# Patient Record
Sex: FEMALE | Race: WHITE | URBAN - METROPOLITAN AREA
[De-identification: names, ages, dates, MRNs, and addresses within clinical notes are randomized per-mention and may not be internally consistent; named-entity substitution may affect disease eponyms.]

---

## 2023-04-21 ENCOUNTER — OFFICE (OUTPATIENT)
Dept: URBAN - METROPOLITAN AREA CLINIC 113 | Facility: CLINIC | Age: 78
Setting detail: OPHTHALMOLOGY
End: 2023-04-21
Payer: MEDICARE

## 2023-04-21 DIAGNOSIS — H35.373: ICD-10-CM

## 2023-04-21 DIAGNOSIS — Z96.1: ICD-10-CM

## 2023-04-21 DIAGNOSIS — H53.001: ICD-10-CM

## 2023-04-21 DIAGNOSIS — H43.811: ICD-10-CM

## 2023-04-21 DIAGNOSIS — H16.223: ICD-10-CM

## 2023-04-21 PROCEDURE — 92014 COMPRE OPH EXAM EST PT 1/>: CPT | Performed by: OPHTHALMOLOGY

## 2023-04-21 PROCEDURE — 92250 FUNDUS PHOTOGRAPHY W/I&R: CPT | Performed by: OPHTHALMOLOGY

## 2023-04-21 ASSESSMENT — REFRACTION_MANIFEST
OD_SPHERE: -0.50
OD_CYLINDER: -0.25
OD_AXIS: 080
OS_VA1: 20/20
OS_ADD: +2.50
OD_AXIS: 133
OD_CYLINDER: -0.50
OS_AXIS: 112
OD_VA1: 20/30-
OS_SPHERE: +1.00
OS_CYLINDER: -0.50
OD_ADD: +1.50
OS_VA1: 20/25
OD_SPHERE: +3.00
OS_ADD: +1.50
OS_SPHERE: PL
OD_VA1: 20/20
OD_ADD: +2.50

## 2023-04-21 ASSESSMENT — REFRACTION_CURRENTRX
OD_OVR_VA: 20/
OS_OVR_VA: 20/
OS_SPHERE: +6.50
OD_AXIS: 087
OD_OVR_VA: 20/
OS_VPRISM_DIRECTION: PROGS
OS_OVR_VA: 20/
OD_CYLINDER: -0.50
OS_SPHERE: +6.50
OD_ADD: +2.50
OD_CYLINDER: -0.50
OD_SPHERE: +3.50
OD_ADD: +2.00
OS_AXIS: 88
OD_AXIS: 94
OD_VPRISM_DIRECTION: PROGS
OS_VPRISM_DIRECTION: PROGS
OS_AXIS: 095
OS_ADD: +2.00
OD_SPHERE: +3.50
OS_CYLINDER: -0.25
OS_ADD: +2.50
OS_CYLINDER: -0.25
OD_VPRISM_DIRECTION: PROGS

## 2023-04-21 ASSESSMENT — REFRACTION_AUTOREFRACTION
OD_AXIS: 087
OD_CYLINDER: -0.75
OD_SPHERE: +0.25
OS_SPHERE: +1.50
OS_CYLINDER: -1.00
OS_AXIS: 089

## 2023-04-21 ASSESSMENT — SPHEQUIV_DERIVED
OS_SPHEQUIV: 0.75
OD_SPHEQUIV: -0.625
OD_SPHEQUIV: 2.75
OD_SPHEQUIV: -0.125
OS_SPHEQUIV: 1

## 2023-04-21 ASSESSMENT — VISUAL ACUITY
OD_BCVA: 20/25
OS_BCVA: 20/25-1

## 2023-04-21 ASSESSMENT — AXIALLENGTH_DERIVED
OD_AL: 23.8379
OD_AL: 22.567
OD_AL: 23.6407
OS_AL: 23.3932
OS_AL: 23.2978

## 2023-04-21 ASSESSMENT — TONOMETRY
OS_IOP_MMHG: 13
OD_IOP_MMHG: 11

## 2023-04-21 ASSESSMENT — KERATOMETRY
OD_AXISANGLE_DEGREES: 099
OD_K2POWER_DIOPTERS: 43.75
OS_K2POWER_DIOPTERS: 43.50
OS_K1POWER_DIOPTERS: 43.00
OS_AXISANGLE_DEGREES: 119
OD_K1POWER_DIOPTERS: 43.25

## 2023-04-21 ASSESSMENT — CONFRONTATIONAL VISUAL FIELD TEST (CVF)
OS_FINDINGS: FULL
OD_FINDINGS: FULL

## 2023-04-21 ASSESSMENT — SUPERFICIAL PUNCTATE KERATITIS (SPK)
OD_SPK: T
OS_SPK: T

## 2023-06-08 PROBLEM — Z00.00 ENCOUNTER FOR PREVENTIVE HEALTH EXAMINATION: Status: ACTIVE | Noted: 2023-06-08

## 2023-06-12 ENCOUNTER — NON-APPOINTMENT (OUTPATIENT)
Age: 78
End: 2023-06-12

## 2023-06-12 ENCOUNTER — APPOINTMENT (OUTPATIENT)
Dept: CARDIOLOGY | Facility: CLINIC | Age: 78
End: 2023-06-12
Payer: MEDICARE

## 2023-06-12 VITALS
TEMPERATURE: 97.7 F | HEIGHT: 58 IN | OXYGEN SATURATION: 99 % | DIASTOLIC BLOOD PRESSURE: 74 MMHG | BODY MASS INDEX: 21.26 KG/M2 | HEART RATE: 79 BPM | SYSTOLIC BLOOD PRESSURE: 118 MMHG | WEIGHT: 101.25 LBS

## 2023-06-12 DIAGNOSIS — Z78.9 OTHER SPECIFIED HEALTH STATUS: ICD-10-CM

## 2023-06-12 DIAGNOSIS — Z87.891 PERSONAL HISTORY OF NICOTINE DEPENDENCE: ICD-10-CM

## 2023-06-12 DIAGNOSIS — Z82.49 FAMILY HISTORY OF ISCHEMIC HEART DISEASE AND OTHER DISEASES OF THE CIRCULATORY SYSTEM: ICD-10-CM

## 2023-06-12 DIAGNOSIS — R00.2 PALPITATIONS: ICD-10-CM

## 2023-06-12 DIAGNOSIS — M81.0 AGE-RELATED OSTEOPOROSIS W/OUT CURRENT PATHOLOGICAL FRACTURE: ICD-10-CM

## 2023-06-12 DIAGNOSIS — R42 DIZZINESS AND GIDDINESS: ICD-10-CM

## 2023-06-12 PROCEDURE — 93000 ELECTROCARDIOGRAM COMPLETE: CPT

## 2023-06-12 PROCEDURE — 99204 OFFICE O/P NEW MOD 45 MIN: CPT

## 2023-06-12 RX ORDER — MULTIVITAMIN
TABLET ORAL DAILY
Refills: 0 | Status: ACTIVE | COMMUNITY
Start: 2023-06-12

## 2023-06-12 RX ORDER — DENOSUMAB 60 MG/ML
60 INJECTION SUBCUTANEOUS
Refills: 0 | Status: ACTIVE | COMMUNITY

## 2023-06-12 RX ORDER — ATORVASTATIN CALCIUM 20 MG/1
20 TABLET, FILM COATED ORAL
Refills: 0 | Status: ACTIVE | COMMUNITY

## 2023-06-12 RX ORDER — LYSINE HCL 500 MG
600 TABLET ORAL
Refills: 0 | Status: ACTIVE | COMMUNITY
Start: 2023-06-12

## 2023-06-12 RX ORDER — CYANOCOBALAMIN (VITAMIN B-12) 1000 MCG
1000 TABLET ORAL DAILY
Refills: 0 | Status: ACTIVE | COMMUNITY
Start: 2023-06-12

## 2023-06-12 RX ORDER — MULTIVIT WITH MIN/MFOLATE/K2 340-15/3 G
50 MCG POWDER (GRAM) ORAL
Refills: 0 | Status: ACTIVE | COMMUNITY
Start: 2023-06-12

## 2023-06-19 PROBLEM — R42 LIGHTHEADEDNESS: Status: ACTIVE | Noted: 2023-06-12

## 2023-06-19 PROBLEM — R00.2 PALPITATIONS: Status: ACTIVE | Noted: 2023-06-12

## 2023-06-19 NOTE — REASON FOR VISIT
[Symptom and Test Evaluation] : symptom and test evaluation [FreeTextEntry1] : 78 y/o F with PMH of Hyperlipidemia and OA present for evaluation based on TTE results and palpitations \par \par Patient notes that she has been having worsening palpitations over the past couple of months since April 2023, she notes that it can occur on a daily basis or multiple times in a day; which she states today she has noted worsening palpitations occurring multiple times prior to the visit. She notes that she was seeing her PMD and discussed she has been having dizziness and was sent for US carotid and TTE. \par Lightheadedness may coincide with the palpitations and notes no syncopal episodes.  \par No lack of appetite and somewhat hydrating herself well \par in 2012 nuclear a stress test and TTE \par \par Smoking Hx: fomer quit 1997, 1ppd \par Family Hx: mother 86 yrs heart attck \par \par Alvino [facility] [address] was [phone] 10.15.2012 (former cardiologist)

## 2023-06-19 NOTE — ASSESSMENT
[FreeTextEntry1] : 76 y/o F with PMH of Hyperlipidemia and OA present for evaluation based on TTE results and palpitations \par \par 1) Palpitations \par - patient notes that she has been has been having palpitations which seems to be worsening\par - Sinus  Rhythm @ 77 bpm, left axis deviation, Low voltage with rightward P-axis and rotation -possible pulmonary disease. \par -TTE done at 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 [facility]\par Cardiology/Vascular Cardiology -[facility]\par Telephone # [phone]\par

## 2023-06-19 NOTE — CARDIOLOGY SUMMARY
[de-identified] : Sinus  Rhythm @ 77 bpm, left axis deviation, Low voltage with rightward P-axis and rotation -possible pulmonary disease.  [de-identified] : Small plaque in each carotid bulb larger on the left side \par No hemodynamically significant stenosis

## 2023-08-22 ENCOUNTER — TRANSCRIPTION ENCOUNTER (OUTPATIENT)
Age: 78
End: 2023-08-22

## 2023-10-13 ENCOUNTER — OFFICE (OUTPATIENT)
Dept: URBAN - METROPOLITAN AREA CLINIC 113 | Facility: CLINIC | Age: 78
Setting detail: OPHTHALMOLOGY
End: 2023-10-13
Payer: MEDICARE

## 2023-10-13 DIAGNOSIS — H16.221: ICD-10-CM

## 2023-10-13 DIAGNOSIS — H35.373: ICD-10-CM

## 2023-10-13 DIAGNOSIS — H16.223: ICD-10-CM

## 2023-10-13 DIAGNOSIS — H16.222: ICD-10-CM

## 2023-10-13 DIAGNOSIS — H43.811: ICD-10-CM

## 2023-10-13 PROCEDURE — 83861 MICROFLUID ANALY TEARS: CPT | Mod: QW,LT | Performed by: OPHTHALMOLOGY

## 2023-10-13 PROCEDURE — 83861 MICROFLUID ANALY TEARS: CPT | Mod: QW,RT | Performed by: OPHTHALMOLOGY

## 2023-10-13 PROCEDURE — 99213 OFFICE O/P EST LOW 20 MIN: CPT | Performed by: OPHTHALMOLOGY

## 2023-10-13 ASSESSMENT — REFRACTION_CURRENTRX
OD_ADD: +2.00
OD_VPRISM_DIRECTION: PROGS
OS_AXIS: 095
OS_OVR_VA: 20/
OS_OVR_VA: 20/
OD_CYLINDER: -0.50
OD_CYLINDER: -0.50
OS_SPHERE: +6.50
OS_ADD: +2.00
OD_SPHERE: +3.50
OD_ADD: +2.50
OS_CYLINDER: -0.25
OS_SPHERE: +6.50
OD_OVR_VA: 20/
OD_SPHERE: +3.50
OD_OVR_VA: 20/
OD_VPRISM_DIRECTION: PROGS
OD_AXIS: 087
OS_ADD: +2.50
OS_AXIS: 88
OS_VPRISM_DIRECTION: PROGS
OS_VPRISM_DIRECTION: PROGS
OD_AXIS: 94
OS_CYLINDER: -0.25

## 2023-10-13 ASSESSMENT — CONFRONTATIONAL VISUAL FIELD TEST (CVF)
OS_FINDINGS: FULL
OD_FINDINGS: FULL

## 2023-10-13 ASSESSMENT — REFRACTION_MANIFEST
OD_AXIS: 133
OS_ADD: +1.50
OS_VA1: 20/20
OD_CYLINDER: -0.25
OS_AXIS: 112
OD_VA1: 20/20
OD_CYLINDER: -0.50
OS_SPHERE: +1.00
OD_ADD: +2.50
OS_VA1: 20/25
OS_ADD: +2.50
OD_SPHERE: -0.50
OD_ADD: +1.50
OS_CYLINDER: -0.50
OS_SPHERE: PL
OD_AXIS: 080
OD_VA1: 20/30-
OD_SPHERE: +3.00

## 2023-10-13 ASSESSMENT — KERATOMETRY
OS_K1POWER_DIOPTERS: 43.00
OD_K2POWER_DIOPTERS: 43.75
OD_K1POWER_DIOPTERS: 43.25
OS_AXISANGLE_DEGREES: 119
OD_AXISANGLE_DEGREES: 099
OS_K2POWER_DIOPTERS: 43.50

## 2023-10-13 ASSESSMENT — SPHEQUIV_DERIVED
OS_SPHEQUIV: 0.75
OD_SPHEQUIV: 0.25
OS_SPHEQUIV: 1.375
OD_SPHEQUIV: -0.625
OD_SPHEQUIV: 2.75

## 2023-10-13 ASSESSMENT — AXIALLENGTH_DERIVED
OD_AL: 23.4949
OS_AL: 23.3932
OD_AL: 23.8379
OS_AL: 23.1562
OD_AL: 22.567

## 2023-10-13 ASSESSMENT — SUPERFICIAL PUNCTATE KERATITIS (SPK)
OD_SPK: T
OS_SPK: 1+

## 2023-10-13 ASSESSMENT — REFRACTION_AUTOREFRACTION
OS_SPHERE: +2.00
OS_CYLINDER: -1.25
OD_AXIS: 080
OD_SPHERE: +0.75
OD_CYLINDER: -1.00
OS_AXIS: 089

## 2023-10-13 ASSESSMENT — VISUAL ACUITY
OD_BCVA: 20/20-1
OS_BCVA: 20/20-1

## 2023-10-13 ASSESSMENT — TONOMETRY
OD_IOP_MMHG: 10
OS_IOP_MMHG: 13

## 2023-10-31 ENCOUNTER — APPOINTMENT (OUTPATIENT)
Dept: CARDIOLOGY | Facility: CLINIC | Age: 78
End: 2023-10-31
Payer: MEDICARE

## 2023-10-31 VITALS
SYSTOLIC BLOOD PRESSURE: 138 MMHG | OXYGEN SATURATION: 100 % | HEART RATE: 69 BPM | DIASTOLIC BLOOD PRESSURE: 72 MMHG | BODY MASS INDEX: 21.41 KG/M2 | HEIGHT: 58 IN | WEIGHT: 102 LBS

## 2023-10-31 DIAGNOSIS — Q26.9 CONGENITAL MALFORMATION OF GREAT VEIN, UNSPECIFIED: ICD-10-CM

## 2023-10-31 DIAGNOSIS — R01.1 CARDIAC MURMUR, UNSPECIFIED: ICD-10-CM

## 2023-10-31 DIAGNOSIS — E78.5 HYPERLIPIDEMIA, UNSPECIFIED: ICD-10-CM

## 2023-10-31 PROCEDURE — 93000 ELECTROCARDIOGRAM COMPLETE: CPT

## 2023-10-31 PROCEDURE — 99213 OFFICE O/P EST LOW 20 MIN: CPT

## 2023-11-13 PROBLEM — R01.1 HEART MURMUR: Status: ACTIVE | Noted: 2023-06-12

## 2023-11-13 PROBLEM — Q26.9 EUSTACHIAN VALVE ENLARGED: Status: ACTIVE | Noted: 2023-11-13

## 2023-11-13 PROBLEM — E78.5 HLD (HYPERLIPIDEMIA): Status: ACTIVE | Noted: 2023-06-12

## 2024-04-19 ENCOUNTER — OFFICE (OUTPATIENT)
Dept: URBAN - METROPOLITAN AREA CLINIC 113 | Facility: CLINIC | Age: 79
Setting detail: OPHTHALMOLOGY
End: 2024-04-19
Payer: MEDICARE

## 2024-04-19 DIAGNOSIS — H43.811: ICD-10-CM

## 2024-04-19 DIAGNOSIS — H16.222: ICD-10-CM

## 2024-04-19 DIAGNOSIS — H35.373: ICD-10-CM

## 2024-04-19 DIAGNOSIS — H16.223: ICD-10-CM

## 2024-04-19 DIAGNOSIS — H16.221: ICD-10-CM

## 2024-04-19 DIAGNOSIS — H01.002: ICD-10-CM

## 2024-04-19 PROBLEM — H01.005 BLEPHARITIS; RIGHT LOWER LID, LEFT LOWER LID: Status: ACTIVE | Noted: 2024-04-19

## 2024-04-19 PROCEDURE — 92014 COMPRE OPH EXAM EST PT 1/>: CPT | Performed by: OPHTHALMOLOGY

## 2024-04-19 PROCEDURE — 83861 MICROFLUID ANALY TEARS: CPT | Mod: QW,RT | Performed by: OPHTHALMOLOGY

## 2024-04-19 PROCEDURE — 92250 FUNDUS PHOTOGRAPHY W/I&R: CPT | Performed by: OPHTHALMOLOGY

## 2024-04-19 PROCEDURE — 83861 MICROFLUID ANALY TEARS: CPT | Mod: QW,LT | Performed by: OPHTHALMOLOGY

## 2024-04-19 ASSESSMENT — LID EXAM ASSESSMENTS
OS_BLEPHARITIS: LLL T
OD_BLEPHARITIS: RLL T

## 2024-11-04 ENCOUNTER — APPOINTMENT (OUTPATIENT)
Dept: CARDIOLOGY | Facility: CLINIC | Age: 79
End: 2024-11-04
Payer: MEDICARE

## 2024-11-04 VITALS
HEIGHT: 58 IN | WEIGHT: 98 LBS | HEART RATE: 71 BPM | BODY MASS INDEX: 20.57 KG/M2 | OXYGEN SATURATION: 99 % | DIASTOLIC BLOOD PRESSURE: 71 MMHG | SYSTOLIC BLOOD PRESSURE: 143 MMHG

## 2024-11-04 DIAGNOSIS — E78.5 HYPERLIPIDEMIA, UNSPECIFIED: ICD-10-CM

## 2024-11-04 PROCEDURE — 99213 OFFICE O/P EST LOW 20 MIN: CPT

## 2024-11-04 PROCEDURE — 93000 ELECTROCARDIOGRAM COMPLETE: CPT

## 2024-11-05 ENCOUNTER — NON-APPOINTMENT (OUTPATIENT)
Age: 79
End: 2024-11-05

## 2024-11-22 ENCOUNTER — OFFICE (OUTPATIENT)
Dept: URBAN - METROPOLITAN AREA CLINIC 113 | Facility: CLINIC | Age: 79
Setting detail: OPHTHALMOLOGY
End: 2024-11-22
Payer: MEDICARE

## 2024-11-22 DIAGNOSIS — Z96.1: ICD-10-CM

## 2024-11-22 DIAGNOSIS — H16.223: ICD-10-CM

## 2024-11-22 DIAGNOSIS — H43.811: ICD-10-CM

## 2024-11-22 DIAGNOSIS — H16.222: ICD-10-CM

## 2024-11-22 DIAGNOSIS — H35.373: ICD-10-CM

## 2024-11-22 DIAGNOSIS — H01.005: ICD-10-CM

## 2024-11-22 DIAGNOSIS — H01.002: ICD-10-CM

## 2024-11-22 DIAGNOSIS — H16.221: ICD-10-CM

## 2024-11-22 PROCEDURE — 83861 MICROFLUID ANALY TEARS: CPT | Mod: QW,RT | Performed by: OPHTHALMOLOGY

## 2024-11-22 PROCEDURE — 83861 MICROFLUID ANALY TEARS: CPT | Mod: QW,LT | Performed by: OPHTHALMOLOGY

## 2024-11-22 PROCEDURE — 92014 COMPRE OPH EXAM EST PT 1/>: CPT | Performed by: OPHTHALMOLOGY

## 2024-11-22 ASSESSMENT — CONFRONTATIONAL VISUAL FIELD TEST (CVF)
OS_FINDINGS: FULL
OD_FINDINGS: FULL

## 2024-11-22 ASSESSMENT — REFRACTION_CURRENTRX
OD_SPHERE: +3.50
OD_CYLINDER: -0.50
OD_OVR_VA: 20/
OS_VPRISM_DIRECTION: PROGS
OS_VPRISM_DIRECTION: PROGS
OD_VPRISM_DIRECTION: PROGS
OD_AXIS: 087
OD_OVR_VA: 20/
OS_SPHERE: +6.50
OS_OVR_VA: 20/
OD_ADD: +2.00
OS_AXIS: 88
OS_AXIS: 095
OS_CYLINDER: -0.25
OS_ADD: +2.50
OS_ADD: +2.00
OD_VPRISM_DIRECTION: PROGS
OD_ADD: +2.50
OD_AXIS: 94
OS_OVR_VA: 20/
OD_SPHERE: +3.50
OD_CYLINDER: -0.50
OS_CYLINDER: -0.25
OS_SPHERE: +6.50

## 2024-11-22 ASSESSMENT — REFRACTION_AUTOREFRACTION
OS_AXIS: 101
OS_SPHERE: +1.75
OD_CYLINDER: -1.00
OD_AXIS: 075
OD_SPHERE: +0.25
OS_CYLINDER: -0.75

## 2024-11-22 ASSESSMENT — VISUAL ACUITY
OS_BCVA: 20/25+1
OD_BCVA: 20/25-1

## 2024-11-22 ASSESSMENT — REFRACTION_MANIFEST
OD_ADD: +1.50
OS_CYLINDER: -0.50
OD_SPHERE: +3.00
OD_CYLINDER: -0.25
OD_VA1: 20/20
OS_SPHERE: +1.00
OS_ADD: +1.50
OS_AXIS: 112
OD_AXIS: 133
OD_AXIS: 080
OD_SPHERE: -0.50
OD_CYLINDER: -0.50
OS_VA1: 20/25
OD_ADD: +2.50
OS_SPHERE: PL
OD_VA1: 20/30-
OS_VA1: 20/20
OS_ADD: +2.50

## 2024-11-22 ASSESSMENT — KERATOMETRY
OD_K1POWER_DIOPTERS: 43.25
OD_AXISANGLE_DEGREES: 099
OS_K2POWER_DIOPTERS: 43.50
OS_AXISANGLE_DEGREES: 119
OS_K1POWER_DIOPTERS: 43.00
OD_K2POWER_DIOPTERS: 43.75

## 2024-11-22 ASSESSMENT — SUPERFICIAL PUNCTATE KERATITIS (SPK)
OD_SPK: T
OS_SPK: 1+

## 2024-11-22 ASSESSMENT — TONOMETRY
OS_IOP_MMHG: 12
OD_IOP_MMHG: 12

## 2024-11-22 ASSESSMENT — LID EXAM ASSESSMENTS
OS_BLEPHARITIS: LLL T
OD_BLEPHARITIS: RLL T

## 2025-05-23 ENCOUNTER — OFFICE (OUTPATIENT)
Dept: URBAN - METROPOLITAN AREA CLINIC 113 | Facility: CLINIC | Age: 80
Setting detail: OPHTHALMOLOGY
End: 2025-05-23
Payer: MEDICARE

## 2025-05-23 DIAGNOSIS — H16.221: ICD-10-CM

## 2025-05-23 DIAGNOSIS — H43.811: ICD-10-CM

## 2025-05-23 DIAGNOSIS — H16.223: ICD-10-CM

## 2025-05-23 DIAGNOSIS — H35.373: ICD-10-CM

## 2025-05-23 DIAGNOSIS — Z96.1: ICD-10-CM

## 2025-05-23 DIAGNOSIS — H16.222: ICD-10-CM

## 2025-05-23 DIAGNOSIS — H01.005: ICD-10-CM

## 2025-05-23 DIAGNOSIS — H01.002: ICD-10-CM

## 2025-05-23 PROCEDURE — 92250 FUNDUS PHOTOGRAPHY W/I&R: CPT | Performed by: OPHTHALMOLOGY

## 2025-05-23 PROCEDURE — 92014 COMPRE OPH EXAM EST PT 1/>: CPT | Performed by: OPHTHALMOLOGY

## 2025-05-23 PROCEDURE — 83861 MICROFLUID ANALY TEARS: CPT | Performed by: OPHTHALMOLOGY

## 2025-05-23 ASSESSMENT — REFRACTION_CURRENTRX
OD_OVR_VA: 20/
OS_CYLINDER: -0.25
OD_SPHERE: +3.50
OD_VPRISM_DIRECTION: PROGS
OD_AXIS: 087
OD_VPRISM_DIRECTION: PROGS
OS_VPRISM_DIRECTION: PROGS
OS_CYLINDER: -0.25
OS_OVR_VA: 20/
OS_ADD: +2.50
OS_OVR_VA: 20/
OD_SPHERE: +3.50
OS_SPHERE: +6.50
OD_ADD: +2.50
OD_OVR_VA: 20/
OD_CYLINDER: -0.50
OD_CYLINDER: -0.50
OS_ADD: +2.00
OS_SPHERE: +6.50
OS_AXIS: 095
OD_ADD: +2.00
OS_AXIS: 88
OD_AXIS: 94
OS_VPRISM_DIRECTION: PROGS

## 2025-05-23 ASSESSMENT — REFRACTION_MANIFEST
OD_ADD: +2.50
OS_ADD: +1.50
OS_SPHERE: +1.00
OD_SPHERE: +3.00
OD_CYLINDER: -0.50
OD_AXIS: 133
OS_VA1: 20/25
OD_VA1: 20/20
OS_CYLINDER: -0.50
OD_SPHERE: -0.50
OS_ADD: +2.50
OD_ADD: +1.50
OD_VA1: 20/30-
OD_CYLINDER: -0.25
OS_VA1: 20/20
OS_AXIS: 112
OS_SPHERE: PL
OD_AXIS: 080

## 2025-05-23 ASSESSMENT — TONOMETRY
OD_IOP_MMHG: 11
OS_IOP_MMHG: 11

## 2025-05-23 ASSESSMENT — CONFRONTATIONAL VISUAL FIELD TEST (CVF)
OS_FINDINGS: FULL
OD_FINDINGS: FULL

## 2025-05-23 ASSESSMENT — REFRACTION_AUTOREFRACTION
OS_CYLINDER: -0.75
OD_SPHERE: +0.50
OD_AXIS: 084
OS_AXIS: 094
OS_SPHERE: +1.75
OD_CYLINDER: -1.00

## 2025-05-23 ASSESSMENT — KERATOMETRY
OS_AXISANGLE_DEGREES: 117
OS_K1POWER_DIOPTERS: 43.00
OD_K2POWER_DIOPTERS: 43.75
OD_K1POWER_DIOPTERS: 43.50
OD_AXISANGLE_DEGREES: 119
OS_K2POWER_DIOPTERS: 43.50

## 2025-05-23 ASSESSMENT — LID EXAM ASSESSMENTS
OD_BLEPHARITIS: RLL T
OS_BLEPHARITIS: LLL T

## 2025-05-23 ASSESSMENT — VISUAL ACUITY
OS_BCVA: 20/20-1
OD_BCVA: 20/25-1

## 2025-05-23 ASSESSMENT — SUPERFICIAL PUNCTATE KERATITIS (SPK)
OD_SPK: T
OS_SPK: 1+